# Patient Record
Sex: FEMALE | Race: WHITE | NOT HISPANIC OR LATINO
[De-identification: names, ages, dates, MRNs, and addresses within clinical notes are randomized per-mention and may not be internally consistent; named-entity substitution may affect disease eponyms.]

---

## 2023-05-25 ENCOUNTER — APPOINTMENT (OUTPATIENT)
Dept: RHEUMATOLOGY | Facility: CLINIC | Age: 33
End: 2023-05-25
Payer: COMMERCIAL

## 2023-05-25 VITALS
OXYGEN SATURATION: 98 % | WEIGHT: 143.3 LBS | BODY MASS INDEX: 23.88 KG/M2 | HEART RATE: 75 BPM | DIASTOLIC BLOOD PRESSURE: 80 MMHG | SYSTOLIC BLOOD PRESSURE: 120 MMHG | HEIGHT: 64.96 IN

## 2023-05-25 DIAGNOSIS — M25.50 PAIN IN UNSPECIFIED JOINT: ICD-10-CM

## 2023-05-25 DIAGNOSIS — Z78.9 OTHER SPECIFIED HEALTH STATUS: ICD-10-CM

## 2023-05-25 PROBLEM — Z00.00 ENCOUNTER FOR PREVENTIVE HEALTH EXAMINATION: Status: ACTIVE | Noted: 2023-05-25

## 2023-05-25 PROCEDURE — 99204 OFFICE O/P NEW MOD 45 MIN: CPT

## 2023-05-25 NOTE — HISTORY OF PRESENT ILLNESS
[FreeTextEntry1] : Pt reports joint pain in her wrists, fingers, elbow, shoulders, knees, ankles, toes, since September of last year. Pain is constant, especially at night time and when she's not moving. Reports pain in the morning with stiffness for 1 hour. Reports joint swelling in her fingers. Pain is less when moving around and doing activities. Laying down and using stairs makes her symptoms worse. She has been taking prednisone the last two days that have helped her joint pains. Before taking prednisone she was not able to walk or raise her arms. She's had labs in April that showed + SAMAN 1:320, CRP 9, RF and CCP negative. CBC and CMP are normal. TSH was elevated. \par \par +Raynauds syndrome diagnosed September 2022 noticed in cold weather and when stressed on Amlodipine 5 mg so far hasn't improvement\par +fatigue \par \par Denies fevers, weight loss, night sweats, rash, photosensitivity, oral ulcers, nasal ulcers, +hair loss, sinus problems, nosebleeds, visual disturbances, +dry eyes, ?dry mouth with sour taste in mouth and throat, history of VTE, history of miscarriage, history of serositis \par \par No family history of autoimmune disease

## 2023-05-25 NOTE — ASSESSMENT
[FreeTextEntry1] : Positive SAMAN 1:320\par Arthralgias\par -Patient has joint pains ongoing for 1 year and improvement in symptoms with prednisone. Concern is for inflammatory arthritis and will check SAMAN, dsDNA, RNP, Jamil, SSA, SSB, ESR, CRP, hepatitis B and C, TB quantiferon, scleroderma antibodies\par -Start prednisone 20 mg x 7 days and then 15 mg until follow up in 2 weeks

## 2023-05-26 LAB
C3 SERPL-MCNC: 101 MG/DL
C4 SERPL-MCNC: 17 MG/DL
CK SERPL-CCNC: 66 U/L
CRP SERPL-MCNC: <3 MG/L
HBV CORE IGG+IGM SER QL: NONREACTIVE
HBV CORE IGM SER QL: NONREACTIVE
HBV SURFACE AB SER QL: REACTIVE
HBV SURFACE AG SER QL: NONREACTIVE
HCV AB SER QL: NONREACTIVE
HCV S/CO RATIO: 0.27 S/CO

## 2023-05-30 LAB
ANA PAT FLD IF-IMP: ABNORMAL
ANA SER IF-ACNC: ABNORMAL
DSDNA AB SER-ACNC: <12 IU/ML
ENA RNP AB SER IA-ACNC: 7.7 AL
ENA SCL70 IGG SER IA-ACNC: <0.2 AL
ENA SM AB SER IA-ACNC: 6.4 AL
ENA SS-A AB SER IA-ACNC: <0.2 AL
ENA SS-B AB SER IA-ACNC: <0.2 AL
M TB IFN-G BLD-IMP: NEGATIVE
QUANTIFERON TB PLUS MITOGEN MINUS NIL: 9 IU/ML
QUANTIFERON TB PLUS NIL: 0.01 IU/ML
QUANTIFERON TB PLUS TB1 MINUS NIL: 0.01 IU/ML
QUANTIFERON TB PLUS TB2 MINUS NIL: 0.01 IU/ML

## 2023-06-08 ENCOUNTER — TRANSCRIPTION ENCOUNTER (OUTPATIENT)
Age: 33
End: 2023-06-08

## 2023-06-08 ENCOUNTER — APPOINTMENT (OUTPATIENT)
Dept: RHEUMATOLOGY | Facility: CLINIC | Age: 33
End: 2023-06-08
Payer: COMMERCIAL

## 2023-06-08 DIAGNOSIS — M32.9 SYSTEMIC LUPUS ERYTHEMATOSUS, UNSPECIFIED: ICD-10-CM

## 2023-06-08 PROCEDURE — 99204 OFFICE O/P NEW MOD 45 MIN: CPT | Mod: 95

## 2023-06-08 RX ORDER — PREDNISONE 10 MG/1
10 TABLET ORAL
Refills: 0 | Status: DISCONTINUED | COMMUNITY
End: 2023-06-08

## 2023-06-08 RX ORDER — PREDNISONE 10 MG/1
10 TABLET ORAL
Qty: 60 | Refills: 0 | Status: ACTIVE | COMMUNITY
Start: 2023-05-25 | End: 1900-01-01

## 2023-06-08 RX ORDER — HYDROXYCHLOROQUINE SULFATE 200 MG/1
200 TABLET, FILM COATED ORAL
Qty: 45 | Refills: 2 | Status: ACTIVE | COMMUNITY
Start: 2023-06-08 | End: 1900-01-01

## 2023-06-08 NOTE — HISTORY OF PRESENT ILLNESS
[Home] : at home, [unfilled] , at the time of the visit. [Medical Office: ([facility])___] : at the medical office located in  [Verbal consent obtained from patient] : the patient, [unfilled] [FreeTextEntry1] : 6/8/23:\júnior Pt has labs showing SLE with SAMAN 1:640, Jamil Ab 6.4, RNP 7.7\júnior Pt feels well today since starting prednisone. She has no joint pains. Reports increased appetite she tried decreasing prednisone but joint pain and body pains returned. \par \par \par Initial visit:\júnior Pt reports joint pain in her wrists, fingers, elbow, shoulders, knees, ankles, toes, since September of last year. Pain is constant, especially at night time and when she's not moving. Reports pain in the morning with stiffness for 1 hour. Reports joint swelling in her fingers. Pain is less when moving around and doing activities. Laying down and using stairs makes her symptoms worse. She has been taking prednisone the last two days that have helped her joint pains. Before taking prednisone she was not able to walk or raise her arms. She's had labs in April that showed + SAMAN 1:320, CRP 9, RF and CCP negative. CBC and CMP are normal. TSH was elevated. \par \par +Raynauds syndrome diagnosed September 2022 noticed in cold weather and when stressed on Amlodipine 5 mg so far hasn't improvement\par +fatigue \par \par Denies fevers, weight loss, night sweats, rash, photosensitivity, oral ulcers, nasal ulcers, +hair loss, sinus problems, nosebleeds, visual disturbances, +dry eyes, ?dry mouth with sour taste in mouth and throat, history of VTE, history of miscarriage, history of serositis \par \par No family history of autoimmune disease

## 2023-06-08 NOTE — REASON FOR VISIT
[Follow-Up: _____] : a [unfilled] follow-up visit [Source: ______] : History obtained from [unfilled] [FreeTextEntry1] : Arthralgias

## 2023-06-08 NOTE — ASSESSMENT
[FreeTextEntry1] : Lupus\par -Patient has joint pains ongoing for 1 year and improvement in symptoms with prednisone. She has fatigue, hair loss, raynauds, dry eyes, dry mouth\par -Labs show SAMAN 1:640, Jamil Ab 6.4, RNP 7.7\par -Continue prednisone 15 mg daily\par -Start Plaquenil 300 mg daily\par -She has moved to [place] I advised her to establish care locally with a rheumatologist to take over her care. If she is unable to get an appointment with a rheumatology she can follow up in 1 month